# Patient Record
Sex: FEMALE | Race: WHITE | Employment: FULL TIME | URBAN - METROPOLITAN AREA
[De-identification: names, ages, dates, MRNs, and addresses within clinical notes are randomized per-mention and may not be internally consistent; named-entity substitution may affect disease eponyms.]

---

## 2020-06-10 ENCOUNTER — THERAPY VISIT (OUTPATIENT)
Dept: PHYSICAL THERAPY | Facility: CLINIC | Age: 55
End: 2020-06-10
Payer: COMMERCIAL

## 2020-06-10 DIAGNOSIS — M72.2 PLANTAR FASCIITIS OF LEFT FOOT: ICD-10-CM

## 2020-06-10 PROCEDURE — 97110 THERAPEUTIC EXERCISES: CPT | Mod: GP | Performed by: PHYSICAL THERAPIST

## 2020-06-10 PROCEDURE — 97035 APP MDLTY 1+ULTRASOUND EA 15: CPT | Mod: GP | Performed by: PHYSICAL THERAPIST

## 2020-06-10 PROCEDURE — 97161 PT EVAL LOW COMPLEX 20 MIN: CPT | Mod: GP | Performed by: PHYSICAL THERAPIST

## 2020-06-10 PROCEDURE — 97140 MANUAL THERAPY 1/> REGIONS: CPT | Mod: GP | Performed by: PHYSICAL THERAPIST

## 2020-06-10 NOTE — PROGRESS NOTES
[facility] Initial Evaluation  Subjective:  Nasreen Askew is a 55 year old female with a left foot condition.    The condition occurred due to unknown reasons.  The condition occurred insidiously.  This is a chronic/recurrent condition.    Mechanism/History of injury/symptoms:  6/9/20 patient received MD orders for PT for left foot plantar fascitis that has been ongoing since November 2019 but she had plantar fascitis years ago as well.  The pain is located medial heel, posterior heel and the quality of pain is reported as sharp, throbbing, aching.  The degree of pain is reported as 4/10. The timing of pain/symptoms is mostly constant, worse as day goes on. Associated symptoms include: swelling, tenderness    Symptoms are exacerbated by: standing, walking, weightbearing.  Symptoms are relieved by: NSAID's, rest.  Since onset symptoms are gradually worsening.    Special tests for this condition include: x-ray.  Previous treatments for this condition include: injection, gave 2 weeks of relief, stretches.    General health as reported by patient is fair.  Pertinent medical history includes: high blood pressure, menopausal, overweight.  Medical allergies include: none.  Other surgeries include: none.  Current medications:  Anti-inflammatory, high blood pressure    Current occupation: .  Patient's home/work tasks include: computer work.  Patient is currently working in normal job without restrictions.    Potential barriers to physical therapy: none.  Red flags: none.    Previous level of function: able to stand, walk without foot pain  Current functional restrictions:  Limited to walking, standing <1 hour due to pain.    HPI                    Objective:  ANKLE:     PROM L PROM R AROM L AROM R MMT L MMT R   Dorsiflexion 12 20 10 20 5/5 5/5   Plantarflexion 50 50 50 50 5/5 5/5   Inversion 40 40 40 40 4+/5 5/5   Eversion 15 15 15 15 4+/5 5/5   G Toe Ext WNL WNL       G Toe Flex  WNL WNL         Edema:    General: Mild edema noted throughout left ankle and foot    Palpation: left distal achilles insertion very tender as well as medial calcaneus    Gait: antalgic, walks on toes on left when not wearing shoes    Special Tests:   L R   Anterior Drawer - -   Posterior Drawer - -   Valgus Stress - -   Varus Stress - -   External Rotation - -   Mejia's (Achilles) - -   Neo's - -             System    Physical Exam    General     ROS    Assessment/Plan:    Patient is a 55 year old female with left ankle/foot complaints.    Patient has the following significant findings with corresponding treatment plan.                Diagnosis 1:  Bilateral plantar fascitis    Pain -  US, manual therapy, splint/taping/bracing/orthotics, self management, education and home program  Decreased ROM/flexibility - manual therapy, therapeutic exercise and home program  Decreased strength - therapeutic exercise, therapeutic activities and home program  Decreased proprioception - neuro re-education, therapeutic activities and home program  Edema - self management/home program  Impaired gait - gait training and home program  Decreased function - therapeutic activities and home program    Therapy Evaluation Codes:   1) History comprised of:   Personal factors that impact the plan of care:      None.    Comorbidity factors that impact the plan of care are:      High blood pressure, Menopausal and Overweight.     Medications impacting care: Anti-inflammatory and High blood pressure.  2) Examination of Body Systems comprised of:   Body structures and functions that impact the plan of care:      Ankle.   Activity limitations that impact the plan of care are:      Cooking, Stairs, Standing and Walking.  3) Clinical presentation characteristics are:   Stable/Uncomplicated.  4) Decision-Making    Low complexity using standardized patient assessment instrument and/or measureable assessment of functional outcome.  Cumulative  Therapy Evaluation is: Low complexity.    Previous and current functional limitations:  (See Goal Flow Sheet for this information)    Short term and Long term goals: (See Goal Flow Sheet for this information)     Communication ability:  Patient appears to be able to clearly communicate and understand verbal and written communication and follow directions correctly.  Treatment Explanation - The following has been discussed with the patient:   RX ordered/plan of care  Anticipated outcomes  Possible risks and side effects  This patient would benefit from PT intervention to resume normal activities.   Rehab potential is good.    Frequency:  1 X week, once daily  Duration:  for 8 weeks  Discharge Plan:  Achieve all LTG.  Independent in home treatment program.  Reach maximal therapeutic benefit.    Please refer to the daily flowsheet for treatment today, total treatment time and time spent performing 1:1 timed codes.

## 2020-06-10 NOTE — LETTER
[facility] [place]  [address]  [place] [address]  [phone]    Bebe 10, 2020    Re: Nasreen Askew   :   1965  MRN:  [id number]   REFERRING PHYSICIAN:   Benjamin Felty [facility] [place]    Date of Initial Evaluation:  06/10/2020  Visits:  Rxs Used: 1  Reason for Referral:  Plantar fasciitis of left foot    EVALUATION SUMMARY    [facility] Initial Evaluation  Subjective:  Nasreen Askew is a 55 year old female with a left foot condition.    The condition occurred due to unknown reasons.  The condition occurred insidiously.  This is a chronic/recurrent condition.    Mechanism/History of injury/symptoms:  20 patient received MD orders for PT for left foot plantar fascitis that has been ongoing since 2019 but she had plantar fascitis years ago as well.  The pain is located medial heel, posterior heel and the quality of pain is reported as sharp, throbbing, aching.  The degree of pain is reported as 4/10. The timing of pain/symptoms is mostly constant, worse as day goes on. Associated symptoms include: swelling, tenderness    Symptoms are exacerbated by: standing, walking, weightbearing.  Symptoms are relieved by: NSAID's, rest.  Since onset symptoms are gradually worsening.    Special tests for this condition include: x-ray.  Previous treatments for this condition include: injection, gave 2 weeks of relief, stretches.    General health as reported by patient is fair.  Pertinent medical history includes: high blood pressure, menopausal, overweight.  Medical allergies include: none.  Other surgeries include: none.  Current medications:  Anti-inflammatory, high blood pressure    Current occupation: .  Patient's home/work tasks include: computer work.  Patient is currently working in normal job without restrictions.    Potential barriers to physical therapy: none.  Red flags: none.    Previous level of function: able to stand, walk without foot  pain  Current functional restrictions:  Limited to walking, standing <1 hour due to pain.    HPI           Nasreen Askew   :   1965    Objective:  ANKLE:     PROM L PROM R AROM L AROM R MMT L MMT R   Dorsiflexion 12 20 10 20 5/5 5/5   Plantarflexion 50 50 50 50 5/5 5/5   Inversion 40 40 40 40 4+/5 5/5   Eversion 15 15 15 15 4+/5 5/5   G Toe Ext WNL WNL       G Toe Flex WNL WNL         Edema:    General: Mild edema noted throughout left ankle and foot    Palpation: left distal achilles insertion very tender as well as medial calcaneus    Gait: antalgic, walks on toes on left when not wearing shoes    Special Tests:   L R   Anterior Drawer - -   Posterior Drawer - -   Valgus Stress - -   Varus Stress - -   External Rotation - -   Mejia's (Achilles) - -   Neo's - -     System    Physical Exam    General     ROS    Assessment/Plan:    Patient is a 55 year old female with left ankle/foot complaints.    Patient has the following significant findings with corresponding treatment plan.                Diagnosis 1:  Bilateral plantar fascitis    Pain -  US, manual therapy, splint/taping/bracing/orthotics, self management, education and home program  Decreased ROM/flexibility - manual therapy, therapeutic exercise and home program  Decreased strength - therapeutic exercise, therapeutic activities and home program  Decreased proprioception - neuro re-education, therapeutic activities and home program  Edema - self management/home program  Impaired gait - gait training and home program  Decreased function - therapeutic activities and home program      Nasreen Askew   :   1965    Therapy Evaluation Codes:   1) History comprised of:   Personal factors that impact the plan of care:      None.    Comorbidity factors that impact the plan of care are:      High blood pressure, Menopausal and Overweight.     Medications impacting care: Anti-inflammatory and High blood pressure.  2) Examination of Body  Systems comprised of:   Body structures and functions that impact the plan of care:      Ankle.   Activity limitations that impact the plan of care are:      Cooking, Stairs, Standing and Walking.  3) Clinical presentation characteristics are:   Stable/Uncomplicated.  4) Decision-Making    Low complexity using standardized patient assessment instrument and/or measureable assessment of functional outcome.  Cumulative Therapy Evaluation is: Low complexity.    Previous and current functional limitations:  (See Goal Flow Sheet for this information)    Short term and Long term goals: (See Goal Flow Sheet for this information)     Communication ability:  Patient appears to be able to clearly communicate and understand verbal and written communication and follow directions correctly.  Treatment Explanation - The following has been discussed with the patient:   RX ordered/plan of care  Anticipated outcomes  Possible risks and side effects  This patient would benefit from PT intervention to resume normal activities.   Rehab potential is good.    Frequency:  1 X week, once daily  Duration:  for 8 weeks  Discharge Plan:  Achieve all LTG.  Independent in home treatment program.  Reach maximal therapeutic benefit.    Thank you for your referral.    INQUIRIES  Therapist: Edmund Sequeira DPT, SCS  [facility] [place]  [address] [place] [address]  Phone: [phone]  Fax: [phone]

## 2020-06-18 ENCOUNTER — THERAPY VISIT (OUTPATIENT)
Dept: PHYSICAL THERAPY | Facility: CLINIC | Age: 55
End: 2020-06-18
Payer: COMMERCIAL

## 2020-06-18 DIAGNOSIS — M72.2 PLANTAR FASCIITIS OF LEFT FOOT: ICD-10-CM

## 2020-06-18 PROCEDURE — 97035 APP MDLTY 1+ULTRASOUND EA 15: CPT | Mod: GP | Performed by: PHYSICAL THERAPIST

## 2020-06-18 PROCEDURE — 97140 MANUAL THERAPY 1/> REGIONS: CPT | Mod: GP | Performed by: PHYSICAL THERAPIST

## 2020-06-18 PROCEDURE — 97110 THERAPEUTIC EXERCISES: CPT | Mod: GP | Performed by: PHYSICAL THERAPIST

## 2020-06-25 ENCOUNTER — THERAPY VISIT (OUTPATIENT)
Dept: PHYSICAL THERAPY | Facility: CLINIC | Age: 55
End: 2020-06-25
Payer: COMMERCIAL

## 2020-06-25 DIAGNOSIS — M72.2 PLANTAR FASCIITIS OF LEFT FOOT: ICD-10-CM

## 2020-06-25 PROCEDURE — 97035 APP MDLTY 1+ULTRASOUND EA 15: CPT | Mod: GP | Performed by: PHYSICAL THERAPIST

## 2020-06-25 PROCEDURE — 97110 THERAPEUTIC EXERCISES: CPT | Mod: GP | Performed by: PHYSICAL THERAPIST

## 2020-06-25 PROCEDURE — 97140 MANUAL THERAPY 1/> REGIONS: CPT | Mod: GP | Performed by: PHYSICAL THERAPIST

## 2020-07-02 ENCOUNTER — THERAPY VISIT (OUTPATIENT)
Dept: PHYSICAL THERAPY | Facility: CLINIC | Age: 55
End: 2020-07-02
Payer: COMMERCIAL

## 2020-07-02 DIAGNOSIS — M72.2 PLANTAR FASCIITIS OF LEFT FOOT: ICD-10-CM

## 2020-07-02 PROCEDURE — 97110 THERAPEUTIC EXERCISES: CPT | Mod: GP | Performed by: PHYSICAL THERAPIST

## 2020-07-02 PROCEDURE — 97035 APP MDLTY 1+ULTRASOUND EA 15: CPT | Mod: GP | Performed by: PHYSICAL THERAPIST

## 2020-07-02 PROCEDURE — 97140 MANUAL THERAPY 1/> REGIONS: CPT | Mod: GP | Performed by: PHYSICAL THERAPIST

## 2020-07-02 NOTE — PROGRESS NOTES
Subjective:  HPI  Physical Exam                    Objective:  System    Physical Exam    General     ROS    Assessment/Plan:    PROGRESS  REPORT    Progress reporting period is from 6/10/20 to 7/2/20.       SUBJECTIVE  Subjective changes noted by patient:   Nasreen reports she less of the intense shooting pain at the end of the day but she continues to have the same throbbing pain in her foot and her tolerance to walking/standing has not improved.  She notes the PT treatments are helpful but she is pretty sore afterwards.  She will plan to follow up with her podiatrist to see if there is anything else she can do for the pain.    Current pain level is 4/10.     Previous pain level was 4/10.   Changes in function:  MINIMAL  Adverse reaction to treatment or activity: None    OBJECTIVE  Changes noted in objective findings:   Left ankle AROM grossly WNL.    Left ankle strength:    DF 5/5    PF 5/5     INV 5-/5     EV 5-/5    Tenderness along medial calcaneus and medial calcaneal tuberosity.  Decreased tenderness in posterior calcaneus/distal achilles attachment.    Gait is antalgic when walking in sandals for PT appt.     ASSESSMENT/PLAN  Updated problem list and treatment plan: Diagnosis 1:  Plantar fascitis    Pain -  hot/cold therapy, US, manual therapy, self management, education and home program  Decreased ROM/flexibility - manual therapy, therapeutic exercise and home program  Decreased strength - therapeutic exercise, therapeutic activities and home program  Decreased proprioception - neuro re-education, therapeutic activities and home program  Inflammation - cold therapy, US and self management/home program  Decreased function - therapeutic activities and home program  STG/LTGs have been met or progress has been made towards goals:  Minimal progress made toward goals.  Assessment of Progress: The patient's condition is mostly unchanged.  Self Management Plans:  Patient has been instructed in a home treatment  program.  Patient  has been instructed in self management of symptoms.    Nasreen continues to require the following intervention to meet STG and LTG's:  PT    Recommendations:  This patient would benefit from continued therapy.     Frequency:  1 X week, once daily  Duration:  for 4-6 weeks      This patient would benefit from further evaluation to determine if temporary immobilization in walking boot would be an effective way to reduce pain and inflammation in the foot, or if iontophoresis should be considered.    Please refer to the daily flowsheet for treatment today, total treatment time and time spent performing 1:1 timed codes.

## 2020-07-02 NOTE — LETTER
[facility] [place]  [address]  [place] [address]  [phone]    2020    Re: Nasreen Askew   :   1965  MRN:  [id number]   REFERRING PHYSICIAN:   Benjamin Felty [facility] [place]    Date of Initial Evaluation:  06/10/2020  Visits:  Rxs Used: 4  Reason for Referral:  Plantar fasciitis of left foot    EVALUATION SUMMARY    Subjective:  HPI  Physical Exam                 Objective:  System  Physical Exam  General   ROS    Assessment/Plan:    PROGRESS  REPORT    Progress reporting period is from 6/10/20 to 20.       SUBJECTIVE  Subjective changes noted by patient:   Nasreen reports she less of the intense shooting pain at the end of the day but she continues to have the same throbbing pain in her foot and her tolerance to walking/standing has not improved.  She notes the PT treatments are helpful but she is pretty sore afterwards.  She will plan to follow up with her podiatrist to see if there is anything else she can do for the pain.    Current pain level is 4/10.     Previous pain level was 4/10.   Changes in function:  MINIMAL  Adverse reaction to treatment or activity: None    OBJECTIVE  Changes noted in objective findings:   Left ankle AROM grossly WNL.    Left ankle strength:    DF 5/5    PF 5/5     INV 5-/5     EV 5-/5    Nasreen Askew   :   1965    Tenderness along medial calcaneus and medial calcaneal tuberosity.  Decreased tenderness in posterior calcaneus/distal achilles attachment.    Gait is antalgic when walking in sandals for PT appt.     ASSESSMENT/PLAN  Updated problem list and treatment plan: Diagnosis 1:  Plantar fascitis    Pain -  hot/cold therapy, US, manual therapy, self management, education and home program  Decreased ROM/flexibility - manual therapy, therapeutic exercise and home program  Decreased strength - therapeutic exercise, therapeutic activities and home program  Decreased proprioception - neuro re-education, therapeutic activities and home  program  Inflammation - cold therapy, US and self management/home program  Decreased function - therapeutic activities and home program  STG/LTGs have been met or progress has been made towards goals:  Minimal progress made toward goals.  Assessment of Progress: The patient's condition is mostly unchanged.  Self Management Plans:  Patient has been instructed in a home treatment program.  Patient  has been instructed in self management of symptoms.    Nasreen continues to require the following intervention to meet STG and LTG's:  PT    Recommendations:  This patient would benefit from continued therapy.     Frequency:  1 X week, once daily  Duration:  for 4-6 weeks    This patient would benefit from further evaluation to determine if temporary immobilization in walking boot would be an effective way to reduce pain and inflammation in the foot, or if iontophoresis should be considered.    Thank you for your referral.    INQUIRIES  Therapist: Edmund Sequeira DPT, SCS  [facility] [place]  [address] [place] [address]  Phone: [phone]  Fax: [phone]

## 2020-09-23 PROBLEM — M72.2 PLANTAR FASCIITIS OF LEFT FOOT: Status: RESOLVED | Noted: 2020-06-10 | Resolved: 2020-09-23

## 2020-09-23 NOTE — PROGRESS NOTES
Patient is discharged from PT.  Please refer to progress note dated 7/2/20 for last known functional status as well as final objective/subjective values.